# Patient Record
Sex: MALE | Race: ASIAN | URBAN - METROPOLITAN AREA
[De-identification: names, ages, dates, MRNs, and addresses within clinical notes are randomized per-mention and may not be internally consistent; named-entity substitution may affect disease eponyms.]

---

## 2023-02-04 ENCOUNTER — APPOINTMENT (RX ONLY)
Dept: URBAN - METROPOLITAN AREA CLINIC 41 | Facility: CLINIC | Age: 43
Setting detail: DERMATOLOGY
End: 2023-02-04

## 2023-02-04 DIAGNOSIS — B07.8 OTHER VIRAL WARTS: ICD-10-CM | Status: INADEQUATELY CONTROLLED

## 2023-02-04 DIAGNOSIS — L57.8 OTHER SKIN CHANGES DUE TO CHRONIC EXPOSURE TO NONIONIZING RADIATION: ICD-10-CM | Status: STABLE

## 2023-02-04 PROBLEM — D23.39 OTHER BENIGN NEOPLASM OF SKIN OF OTHER PARTS OF FACE: Status: ACTIVE | Noted: 2023-02-04

## 2023-02-04 PROCEDURE — 99203 OFFICE O/P NEW LOW 30 MIN: CPT | Mod: 25

## 2023-02-04 PROCEDURE — ? SKIN MEDICINALS

## 2023-02-04 PROCEDURE — ? PRESCRIPTION MEDICATION MANAGEMENT

## 2023-02-04 PROCEDURE — ? ADDITIONAL NOTES

## 2023-02-04 PROCEDURE — ? BENIGN DESTRUCTION (GENITALS)

## 2023-02-04 PROCEDURE — 54056 CRYOSURGERY PENIS LESION(S): CPT

## 2023-02-04 PROCEDURE — ? COUNSELING

## 2023-02-04 ASSESSMENT — LOCATION DETAILED DESCRIPTION DERM
LOCATION DETAILED: LEFT CENTRAL MALAR CHEEK
LOCATION DETAILED: VENTRAL PENILE SHAFT
LOCATION DETAILED: LEFT DORSAL SHAFT OF PENIS
LOCATION DETAILED: RIGHT DORSAL SHAFT OF PENIS
LOCATION DETAILED: DORSAL PENILE SHAFT
LOCATION DETAILED: RIGHT CENTRAL MALAR CHEEK

## 2023-02-04 ASSESSMENT — LOCATION ZONE DERM
LOCATION ZONE: PENIS
LOCATION ZONE: FACE

## 2023-02-04 ASSESSMENT — LOCATION SIMPLE DESCRIPTION DERM
LOCATION SIMPLE: LEFT CHEEK
LOCATION SIMPLE: RIGHT CHEEK
LOCATION SIMPLE: PENIS

## 2023-02-04 NOTE — HPI: BUMPS
Is This A New Presentation, Or A Follow-Up?: Bump
Additional History: New pt presents small bump that is itchy for past 3 months.\\nPt hasn’t used any treatments\\nPt has no allergies

## 2023-02-04 NOTE — PROCEDURE: COUNSELING
Detail Level: Detailed
Patient Specific Counseling (Will Not Stick From Patient To Patient): —\\nPt informs AF that spot has been growing. AF offers to treat with electrodessication since benign lesion is bothering pt.\\n-\\nLesion treated as a courtesy to pt.
Patient Specific Counseling (Will Not Stick From Patient To Patient): —\\nAF asks if Pt received HPV vaccine. Pt replies saying no. AF provided pt with the name of vaccine and recommends Pt can receive it to avoid future warts.

## 2023-02-04 NOTE — HPI: WART (PATIENT REPORTED)
Where Is Your Wart Located?: Genitals
Additional Comments (Use Complete Sentences): New pt states he has genital warts and [place] it [place]. \\nPt states new ones appeared 3-4 months ago

## 2023-02-04 NOTE — PROCEDURE: BENIGN DESTRUCTION (GENITALS)
Post-Care Instructions: I reviewed with the patient in detail post-care instructions. Patient is to wear sunprotection, and avoid picking at any of the treated lesions. Pt may apply Vaseline to crusted or scabbing areas.
Consent: The patient's consent was obtained including but not limited to risks of crusting, scabbing, blistering, scarring, darker or lighter pigmentary change, recurrence, incomplete removal and infection.
Detail Level: Detailed
Anesthesia Volume In Cc: 0.5
Warning: This plan will only bill for destructions on the Penis and Vulva. If you select the Scrotum it will not bill.
Method: liquid nitrogen
Render Post-Care Instructions In Note?: no

## 2023-02-04 NOTE — HPI: DISCOLORATION
Additional History: New pt notes dark spots on his face that started appearing 3 months ago\\nPt notes no symptoms and no treatments used

## 2023-02-04 NOTE — PROCEDURE: [employer]
Sig: Apply pea sized amount per area at night
Sig: Apply nightly to warts nightly under occlusion
Product Type (1): Lightening Cream
Sig: Apply a thin layer to the affected areas twice daily
Sig: Apply to affected areas twice daily
Sig: Wash affected areas daily.
Sig: Take one twice daily
Lightening Cream: Hydroquinone 8%, Tretinoin 0.025%, Kojic Acid 1%, Niacinamide 4%, Fluocinolone 0.025% Cream
Sig: Apply nightly to warts nightly under occlusion starting two weeks after freezing treatment.
Sig: Apply a thin layer to the scar daily
Intro Statement: I recommended the following products:
Sig: Apply twice daily for 5 days
Sig: Apply a thin layer to the affected areas daily
Sig: Apply a thin layer to the itching areas twice daily as needed
Sig: Apply to affected areas on face twice daily
Sig: Apply thin layer nightly.
Sig: Apply a thin layer to the affected skin twice daily
Detail Level: Simple

## 2023-06-01 ENCOUNTER — APPOINTMENT (RX ONLY)
Dept: URBAN - METROPOLITAN AREA CLINIC 41 | Facility: CLINIC | Age: 43
Setting detail: DERMATOLOGY
End: 2023-06-01

## 2023-06-01 DIAGNOSIS — B07.8 OTHER VIRAL WARTS: ICD-10-CM | Status: INADEQUATELY CONTROLLED

## 2023-06-01 PROCEDURE — ? LIQUID NITROGEN GENITALS

## 2023-06-01 PROCEDURE — ? ADDITIONAL NOTES

## 2023-06-01 PROCEDURE — 54056 CRYOSURGERY PENIS LESION(S): CPT

## 2023-06-01 PROCEDURE — ? COUNSELING

## 2023-06-01 ASSESSMENT — LOCATION ZONE DERM: LOCATION ZONE: PENIS

## 2023-06-01 ASSESSMENT — LOCATION DETAILED DESCRIPTION DERM
LOCATION DETAILED: DORSAL PENILE SHAFT
LOCATION DETAILED: LEFT DORSAL SHAFT OF PENIS

## 2023-06-01 ASSESSMENT — LOCATION SIMPLE DESCRIPTION DERM: LOCATION SIMPLE: PENIS

## 2023-06-01 NOTE — PROCEDURE: COUNSELING
Patient Specific Counseling (Will Not Stick From Patient To Patient): —\\nPt inquires about HPV vaccine and BG counsels this is not necessary at his age at this time. Pt understands and agrees. Pt can RTO in 1 month for more freezing if not resolved.
Detail Level: Detailed